# Patient Record
Sex: MALE | URBAN - METROPOLITAN AREA
[De-identification: names, ages, dates, MRNs, and addresses within clinical notes are randomized per-mention and may not be internally consistent; named-entity substitution may affect disease eponyms.]

---

## 2020-10-13 ENCOUNTER — OFFICE VISIT (OUTPATIENT)
Dept: URBAN - METROPOLITAN AREA CLINIC 88 | Facility: CLINIC | Age: 45
End: 2020-10-13

## 2020-10-13 DIAGNOSIS — H43.392 OTHER VITREOUS OPACITIES, LEFT EYE: ICD-10-CM

## 2020-10-13 DIAGNOSIS — H31.322 CHOROIDAL RUPTURE, LEFT EYE: ICD-10-CM

## 2020-10-13 DIAGNOSIS — H50.9 UNSPECIFIED STRABISMUS: ICD-10-CM

## 2020-10-13 DIAGNOSIS — S02.40DS MAXILLARY FRACTURE, LEFT SIDE, SEQUELA: ICD-10-CM

## 2020-10-13 DIAGNOSIS — S02.842S: ICD-10-CM

## 2020-10-13 DIAGNOSIS — H25.13 AGE-RELATED NUCLEAR CATARACT, BILATERAL: ICD-10-CM

## 2020-10-13 DIAGNOSIS — S02.40FS ZYGOMATIC FRACTURE, LEFT SIDE, SEQUELA: ICD-10-CM

## 2020-10-13 DIAGNOSIS — H31.012 MACULA SCAR OF POST POLE OF LEFT EYE: Primary | ICD-10-CM

## 2020-10-13 PROCEDURE — 92250 FUNDUS PHOTOGRAPHY W/I&R: CPT | Performed by: OPHTHALMOLOGY

## 2020-10-13 PROCEDURE — 92004 COMPRE OPH EXAM NEW PT 1/>: CPT | Performed by: OPHTHALMOLOGY

## 2020-10-13 ASSESSMENT — INTRAOCULAR PRESSURE
OS: 16
OD: 14

## 2020-10-13 ASSESSMENT — VISUAL ACUITY
OD: 20/20
OS: CF 1FT

## 2020-10-13 ASSESSMENT — KERATOMETRY
OS: 42.50
OD: 42.25

## 2020-10-13 NOTE — IMPRESSION/PLAN
Impression: Other vitreous opacities, left eye: H43.392. Condition: stable. Plan: Discussed signs and symptoms of PVD/floaters. Patient instructed to call the office immediately if any symptoms noted.

## 2020-10-15 NOTE — IMPRESSION/PLAN
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 Plan: The findings were discussed with the patient. He will need long term follow up for his left eye injuries and complications. He may need evaluation by Plastic Surgeon or ENT surgeon for his Orbital and Maxillary fractures. A letter will follow to explain further the care he will need long term.